# Patient Record
Sex: MALE | Race: WHITE | Employment: FULL TIME | URBAN - METROPOLITAN AREA
[De-identification: names, ages, dates, MRNs, and addresses within clinical notes are randomized per-mention and may not be internally consistent; named-entity substitution may affect disease eponyms.]

---

## 2018-06-07 ENCOUNTER — OFFICE VISIT (OUTPATIENT)
Dept: ORTHOPEDIC SURGERY | Age: 23
End: 2018-06-07

## 2018-06-07 VITALS — HEIGHT: 72 IN | BODY MASS INDEX: 30.61 KG/M2 | WEIGHT: 226 LBS

## 2018-06-07 DIAGNOSIS — S62.619A AVULSION FRACTURE OF PROXIMAL PHALANX OF FINGER, CLOSED, INITIAL ENCOUNTER: ICD-10-CM

## 2018-06-07 DIAGNOSIS — S63.642A GAMEKEEPER'S THUMB OF LEFT HAND, INITIAL ENCOUNTER: Primary | ICD-10-CM

## 2018-06-07 DIAGNOSIS — M79.645 PAIN OF LEFT THUMB: ICD-10-CM

## 2018-06-07 PROCEDURE — L3908 WHO COCK-UP NONMOLDE PRE OTS: HCPCS | Performed by: INTERNAL MEDICINE

## 2018-06-07 PROCEDURE — 99203 OFFICE O/P NEW LOW 30 MIN: CPT | Performed by: INTERNAL MEDICINE

## 2018-06-21 ENCOUNTER — OFFICE VISIT (OUTPATIENT)
Dept: ORTHOPEDIC SURGERY | Age: 23
End: 2018-06-21

## 2018-06-21 VITALS — WEIGHT: 226 LBS | BODY MASS INDEX: 30.61 KG/M2 | HEIGHT: 72 IN

## 2018-06-21 DIAGNOSIS — S62.619A AVULSION FRACTURE OF PROXIMAL PHALANX OF FINGER, CLOSED, INITIAL ENCOUNTER: ICD-10-CM

## 2018-06-21 DIAGNOSIS — S63.642A GAMEKEEPER'S THUMB OF LEFT HAND, INITIAL ENCOUNTER: Primary | ICD-10-CM

## 2018-06-21 PROCEDURE — 99213 OFFICE O/P EST LOW 20 MIN: CPT | Performed by: INTERNAL MEDICINE

## 2018-07-12 ENCOUNTER — OFFICE VISIT (OUTPATIENT)
Dept: ORTHOPEDIC SURGERY | Age: 23
End: 2018-07-12

## 2018-07-12 VITALS — WEIGHT: 226 LBS | BODY MASS INDEX: 30.61 KG/M2 | HEIGHT: 72 IN

## 2018-07-12 DIAGNOSIS — S63.642A GAMEKEEPER'S THUMB OF LEFT HAND, INITIAL ENCOUNTER: Primary | ICD-10-CM

## 2018-07-12 DIAGNOSIS — S62.619A AVULSION FRACTURE OF PROXIMAL PHALANX OF FINGER, CLOSED, INITIAL ENCOUNTER: ICD-10-CM

## 2018-07-12 DIAGNOSIS — M79.645 PAIN OF LEFT THUMB: ICD-10-CM

## 2018-07-12 PROCEDURE — 99213 OFFICE O/P EST LOW 20 MIN: CPT | Performed by: INTERNAL MEDICINE

## 2018-07-12 PROCEDURE — L3918 METACARP FX ORTHOSIS PRE OTS: HCPCS | Performed by: INTERNAL MEDICINE

## 2018-07-12 NOTE — PROGRESS NOTES
 [employer] [facility] Restriction Brace     Patient was prescribed a [employer] [facility] Restriction Brace. The left thumb will require stabilization / immobilization from this semi-rigid / rigid orthosis to improve their function. The orthosis will assist in protecting the affected area, provide functional support and facilitate healing. The patient was educated and fit by a healthcare professional with expert knowledge and specialization in brace application while under the direct supervision of the physician. Verbal and written instructions for the use of and application of this item were provided. They were instructed to contact the office immediately should the brace result in increased pain, decreased sensation, increased swelling or worsening of the condition. Laura Egan MD.      Disclaimer: \"This note was dictated with voice recognition software. Though review and correction are routine, we apologize for any errors. \"